# Patient Record
Sex: FEMALE | Race: BLACK OR AFRICAN AMERICAN | NOT HISPANIC OR LATINO | URBAN - METROPOLITAN AREA
[De-identification: names, ages, dates, MRNs, and addresses within clinical notes are randomized per-mention and may not be internally consistent; named-entity substitution may affect disease eponyms.]

---

## 2017-03-17 ENCOUNTER — PATIENT MESSAGE (OUTPATIENT)
Dept: OBSTETRICS AND GYNECOLOGY | Facility: CLINIC | Age: 37
End: 2017-03-17

## 2017-03-18 ENCOUNTER — NURSE TRIAGE (OUTPATIENT)
Dept: ADMINISTRATIVE | Facility: CLINIC | Age: 37
End: 2017-03-18

## 2017-03-18 NOTE — TELEPHONE ENCOUNTER
Reason for Disposition   Message left on identified answering machine.    Protocols used: ST NO CONTACT OR DUPLICATE CONTACT CALL-A-AH

## 2017-03-20 ENCOUNTER — OFFICE VISIT (OUTPATIENT)
Dept: OBSTETRICS AND GYNECOLOGY | Facility: CLINIC | Age: 37
End: 2017-03-20
Payer: COMMERCIAL

## 2017-03-20 VITALS
SYSTOLIC BLOOD PRESSURE: 112 MMHG | BODY MASS INDEX: 37.47 KG/M2 | WEIGHT: 224.88 LBS | DIASTOLIC BLOOD PRESSURE: 80 MMHG | HEIGHT: 65 IN

## 2017-03-20 DIAGNOSIS — L73.9 FOLLICULITIS: Primary | ICD-10-CM

## 2017-03-20 PROCEDURE — 99214 OFFICE O/P EST MOD 30 MIN: CPT | Mod: S$GLB,,, | Performed by: NURSE PRACTITIONER

## 2017-03-20 PROCEDURE — 99999 PR PBB SHADOW E&M-EST. PATIENT-LVL II: CPT | Mod: PBBFAC,,, | Performed by: NURSE PRACTITIONER

## 2017-03-20 PROCEDURE — 87529 HSV DNA AMP PROBE: CPT

## 2017-03-20 PROCEDURE — 1160F RVW MEDS BY RX/DR IN RCRD: CPT | Mod: S$GLB,,, | Performed by: NURSE PRACTITIONER

## 2017-03-20 RX ORDER — CYCLOBENZAPRINE HCL 10 MG
10 TABLET ORAL
COMMUNITY
End: 2017-03-20

## 2017-03-20 RX ORDER — HYDROCODONE BITARTRATE AND ACETAMINOPHEN 7.5; 325 MG/1; MG/1
1 TABLET ORAL EVERY 6 HOURS PRN
Refills: 0 | COMMUNITY
Start: 2017-01-27 | End: 2018-01-05

## 2017-03-20 RX ORDER — CEFADROXIL 500 MG/1
500 CAPSULE ORAL EVERY 12 HOURS
Qty: 14 CAPSULE | Refills: 0 | Status: SHIPPED | OUTPATIENT
Start: 2017-03-20 | End: 2017-03-27

## 2017-03-20 NOTE — PROGRESS NOTES
"Camille Yuen is a 37 y.o. female  presents with complaint of vaginal irritation after pulling pubic hairs from vaginal area and then shaving.  When she got out of shower sprayed body spray in vaginal area and has been very tender and itching since last Wednesday.     Past Medical History:   Diagnosis Date    Abnormal Pap smear     EKL, HPV, per pt, all normal since    Abnormal Pap smear of cervix     Muscle spasm      Past Surgical History:   Procedure Laterality Date     SECTION      x 1    TUBAL LIGATION       Social History   Substance Use Topics    Smoking status: Never Smoker    Smokeless tobacco: None    Alcohol use Yes      Comment: occasionally     Family History   Problem Relation Age of Onset    Diabetes Maternal Grandmother     Diabetes Mother     Breast cancer Neg Hx     Colon cancer Neg Hx     Ovarian cancer Neg Hx     Thrombophilia Neg Hx      OB History    Para Term  AB SAB TAB Ectopic Multiple Living   2 2       1 3      # Outcome Date GA Lbr Toby/2nd Weight Sex Delivery Anes PTL Lv   2 Para            1 Para                   /80  Ht 5' 5" (1.651 m)  Wt 102 kg (224 lb 13.9 oz)  LMP 2017  BMI 37.42 kg/m2    ROS:  GENERAL: No fever, chills, fatigability or weight loss.  VULVAR: per hpi  VAGINAL:  Per hpi  ABDOMEN: No abdominal pain. Denies nausea. Denies vomiting. No diarrhea. No constipation  BREAST: Denies pain. No lumps. No discharge.  URINARY: No incontinence, no nocturia, no frequency and no dysuria.  CARDIOVASCULAR: No chest pain. No shortness of breath. No leg cramps.  NEUROLOGICAL: No headaches. No vision changes.    PHYSICAL EXAM:  VULVA: has at right inner labia a fluid filled round ulcerated lesion - was not able to express any fluid and did not open b/c pt could not tolerate - also was only about 3-4 mm in diameter so is very small, outer left labia with ulcerated lesion - HSV culture taken of both    ASSESSMENT and PLAN:  1. " Folliculitis  Herpes Simplex Virus 1&2 PCR Non-Blood Genital    cefadroxil (DURICEF) 500 MG Cap     Did discuss with pt that these ulcerations could be HSV and needed to rule out  Will start an antibiotic and do warm soaks while wait for culture - pt stated understanding    Patient was counseled today on vaginitis prevention including :  a. avoiding feminine products such as deoderant soaps, body wash, bubble bath, douches, scented toilet paper, deoderant tampons or pads, feminine wipes, chronic pad use, etc.  b. avoiding other vulvovaginal irritants such as long hot baths, humidity, tight, synthetic clothing, chlorine and sitting around in wet bathing suits  c. wearing cotton underwear, avoiding thong underwear and no underwear to bed  d. taking showers instead of baths and use a hair dryer on cool setting afterwards to dry  e. wearing cotton to exercise and shower immediately after exercise and change clothes  f. using polyurethane condoms without spermicide if sexually active and symptoms are triggered by intercourse

## 2017-03-23 ENCOUNTER — PATIENT MESSAGE (OUTPATIENT)
Dept: OBSTETRICS AND GYNECOLOGY | Facility: CLINIC | Age: 37
End: 2017-03-23

## 2017-03-23 LAB
HSV PCR SPECIMEN SOURCE: NORMAL
HSV1 PCR RESULT: NOT DETECTED
HSV2 PCR RESULT: NOT DETECTED

## 2017-04-21 ENCOUNTER — OFFICE VISIT (OUTPATIENT)
Dept: OBSTETRICS AND GYNECOLOGY | Facility: CLINIC | Age: 37
End: 2017-04-21
Payer: COMMERCIAL

## 2017-04-21 VITALS
HEIGHT: 65 IN | WEIGHT: 228.81 LBS | SYSTOLIC BLOOD PRESSURE: 132 MMHG | DIASTOLIC BLOOD PRESSURE: 88 MMHG | BODY MASS INDEX: 38.12 KG/M2

## 2017-04-21 DIAGNOSIS — Z71.1 FEARED CONDITION NOT DEMONSTRATED: Primary | ICD-10-CM

## 2017-04-21 PROCEDURE — 99213 OFFICE O/P EST LOW 20 MIN: CPT | Mod: S$GLB,,, | Performed by: OBSTETRICS & GYNECOLOGY

## 2017-04-21 PROCEDURE — 99999 PR PBB SHADOW E&M-EST. PATIENT-LVL II: CPT | Mod: PBBFAC,,, | Performed by: OBSTETRICS & GYNECOLOGY

## 2017-04-21 PROCEDURE — 1160F RVW MEDS BY RX/DR IN RCRD: CPT | Mod: S$GLB,,, | Performed by: OBSTETRICS & GYNECOLOGY

## 2017-04-21 NOTE — PROGRESS NOTES
"Camille Yuen is a 37 y.o.  who presents for   Chief Complaint   Patient presents with    Pelvic Discomfort     c/o pain and itching at incision site   - pt's C/S done by me for her 2nd twin 10 yrs ago - both boys doing well    Pt was upset about last visit and suggestion of pos HSV, the irritation got better w tx and did well on her cruise   - long talk re the nature, signs, symptoms of HSV and difficulty w dx and duty to inform pt of our concern    Patient's last menstrual period was 2017.   Periods are regular q month.   No dysmenorrhea.    Pt is sexually active -  mut monog - uses BTL for contraception.    + hx of abnormal paps, HPV at EKL. Last pap was normal on 16.    Past Medical History:   Diagnosis Date    Abnormal Pap smear     EKL, HPV, per pt, all normal since    Abnormal Pap smear of cervix     Muscle spasm        Past Surgical History:   Procedure Laterality Date     SECTION      x 1    TUBAL LIGATION         Current Outpatient Prescriptions   Medication Sig Dispense Refill    hydrocodone-acetaminophen 7.5-325mg (NORCO) 7.5-325 mg per tablet Take 1 tablet by mouth every 6 (six) hours as needed.  0     No current facility-administered medications for this visit.           Review of patient's allergies indicates:  No Known Allergies    .  Family History   Problem Relation Age of Onset    Diabetes Maternal Grandmother     Diabetes Mother     Breast cancer Neg Hx     Colon cancer Neg Hx     Ovarian cancer Neg Hx     Thrombophilia Neg Hx        Social History   Substance Use Topics    Smoking status: Never Smoker    Smokeless tobacco: None    Alcohol use Yes      Comment: occasionally       /88  Ht 5' 5" (1.651 m)  Wt 103.8 kg (228 lb 13.4 oz)  LMP 2017  BMI 38.08 kg/m2    GEN:  Alert and oriented lady in no acute distress.  HEAD and NECK: Normocephalic.   SKIN: No significant hirsutism or acne              ABDOMEN: Overweight, soft and non " tender. No mass, hepatomegaly, RUQT or CVAT. Pt's pfann[employer]iel [employer] looks fine - area of concern is several 2 mm pale macules in the stria of her lower abdomen - no tenderness or inflam  PELVIC:def          IMPRESSION: benign excoriations      PLAN: reassurance and routine f/u

## 2017-08-14 ENCOUNTER — PATIENT MESSAGE (OUTPATIENT)
Dept: OBSTETRICS AND GYNECOLOGY | Facility: CLINIC | Age: 37
End: 2017-08-14

## 2017-08-14 ENCOUNTER — OFFICE VISIT (OUTPATIENT)
Dept: OBSTETRICS AND GYNECOLOGY | Facility: CLINIC | Age: 37
End: 2017-08-14
Payer: COMMERCIAL

## 2017-08-14 VITALS
DIASTOLIC BLOOD PRESSURE: 80 MMHG | BODY MASS INDEX: 38.12 KG/M2 | HEIGHT: 65 IN | SYSTOLIC BLOOD PRESSURE: 130 MMHG | WEIGHT: 228.81 LBS

## 2017-08-14 DIAGNOSIS — B37.31 YEAST VAGINITIS: ICD-10-CM

## 2017-08-14 DIAGNOSIS — N76.2 ACUTE VULVITIS: Primary | ICD-10-CM

## 2017-08-14 PROCEDURE — 87077 CULTURE AEROBIC IDENTIFY: CPT

## 2017-08-14 PROCEDURE — 87186 SC STD MICRODIL/AGAR DIL: CPT

## 2017-08-14 PROCEDURE — 99999 PR PBB SHADOW E&M-EST. PATIENT-LVL III: CPT | Mod: PBBFAC,,, | Performed by: NURSE PRACTITIONER

## 2017-08-14 PROCEDURE — 87210 SMEAR WET MOUNT SALINE/INK: CPT | Mod: QW,S$GLB,, | Performed by: NURSE PRACTITIONER

## 2017-08-14 PROCEDURE — 99214 OFFICE O/P EST MOD 30 MIN: CPT | Mod: S$GLB,,, | Performed by: NURSE PRACTITIONER

## 2017-08-14 PROCEDURE — 3008F BODY MASS INDEX DOCD: CPT | Mod: S$GLB,,, | Performed by: NURSE PRACTITIONER

## 2017-08-14 PROCEDURE — 87070 CULTURE OTHR SPECIMN AEROBIC: CPT

## 2017-08-14 RX ORDER — FLUCONAZOLE 150 MG/1
TABLET ORAL
Qty: 2 TABLET | Refills: 1 | Status: SHIPPED | OUTPATIENT
Start: 2017-08-14 | End: 2018-01-05

## 2017-08-14 RX ORDER — NYSTATIN AND TRIAMCINOLONE ACETONIDE 100000; 1 [USP'U]/G; MG/G
CREAM TOPICAL
Qty: 30 G | Refills: 1 | Status: SHIPPED | OUTPATIENT
Start: 2017-08-14 | End: 2018-01-05

## 2017-08-14 NOTE — PROGRESS NOTES
"Camille Yuen is a 37 y.o. female  presents with complaint of vaginal itching that seems related with cycle.  This time itching so bad keeping her up at night.     Past Medical History:   Diagnosis Date    Abnormal Pap smear     EKL, HPV, per pt, all normal since    Abnormal Pap smear of cervix     Muscle spasm      Past Surgical History:   Procedure Laterality Date     SECTION      x 1    TUBAL LIGATION       Social History   Substance Use Topics    Smoking status: Never Smoker    Smokeless tobacco: Never Used    Alcohol use Yes      Comment: occasionally     Family History   Problem Relation Age of Onset    Diabetes Maternal Grandmother     Diabetes Mother     Breast cancer Neg Hx     Colon cancer Neg Hx     Ovarian cancer Neg Hx     Thrombophilia Neg Hx      OB History    Para Term  AB Living   2 2       3   SAB TAB Ectopic Multiple Live Births         1        # Outcome Date GA Lbr Toby/2nd Weight Sex Delivery Anes PTL Lv   2 Para            1 Para                   /80   Ht 5' 5" (1.651 m)   Wt 103.8 kg (228 lb 13.4 oz)   LMP 2017 (Exact Date)   BMI 38.08 kg/m²     ROS:  GENERAL: No fever, chills, fatigability or weight loss.  VULVAR: No pain, no lesions and no itching.  VAGINAL: No relaxation, no itching, no discharge, no abnormal bleeding and no lesions.  ABDOMEN: No abdominal pain. Denies nausea. Denies vomiting. No diarrhea. No constipation  BREAST: Denies pain. No lumps. No discharge.  URINARY: No incontinence, no nocturia, no frequency and no dysuria.  CARDIOVASCULAR: No chest pain. No shortness of breath. No leg cramps.  NEUROLOGICAL: No headaches. No vision changes.    PHYSICAL EXAM:  VULVA: inflamed sebaceous gland to mid left labia, VAGINA: vaginal discharge - creamy, CERVIX: normal appearing cervix without discharge or lesions, UTERUS: uterus is normal size, shape, consistency and nontender, ADNEXA: normal adnexa in size, nontender and no " masses, WET MOUNT done - results: spores    ASSESSMENT and PLAN:  1. Acute vulvitis  POCT Wet Prep    Genital Culture    fluconazole (DIFLUCAN) 150 MG Tab    nystatin-triamcinolone (MYCOLOG II) cream    CANCELED: POCT Wet Prep   2. Yeast vaginitis  POCT Wet Prep    Genital Culture    fluconazole (DIFLUCAN) 150 MG Tab    nystatin-triamcinolone (MYCOLOG II) cream     Treat today  Try a dose or two of monistat 7 after cycle resolves monthly     Patient was counseled today on vaginitis prevention including :  a. avoiding feminine products such as deoderant soaps, body wash, bubble bath, douches, scented toilet paper, deoderant tampons or pads, feminine wipes, chronic pad use, etc.  b. avoiding other vulvovaginal irritants such as long hot baths, humidity, tight, synthetic clothing, chlorine and sitting around in wet bathing suits  c. wearing cotton underwear, avoiding thong underwear and no underwear to bed  d. taking showers instead of baths and use a hair dryer on cool setting afterwards to dry  e. wearing cotton to exercise and shower immediately after exercise and change clothes  f. using polyurethane condoms without spermicide if sexually active and symptoms are triggered by intercourse

## 2017-08-17 DIAGNOSIS — N76.0 BV (BACTERIAL VAGINOSIS): Primary | ICD-10-CM

## 2017-08-17 DIAGNOSIS — B96.89 BV (BACTERIAL VAGINOSIS): Primary | ICD-10-CM

## 2017-08-17 RX ORDER — SULFAMETHOXAZOLE AND TRIMETHOPRIM 800; 160 MG/1; MG/1
1 TABLET ORAL 2 TIMES DAILY
Qty: 14 TABLET | Refills: 0 | Status: SHIPPED | OUTPATIENT
Start: 2017-08-17 | End: 2017-08-24

## 2017-08-18 ENCOUNTER — PATIENT MESSAGE (OUTPATIENT)
Dept: OBSTETRICS AND GYNECOLOGY | Facility: CLINIC | Age: 37
End: 2017-08-18

## 2017-08-18 LAB — BACTERIA GENITAL AEROBE CULT: NORMAL

## 2017-08-21 ENCOUNTER — PATIENT MESSAGE (OUTPATIENT)
Dept: OBSTETRICS AND GYNECOLOGY | Facility: CLINIC | Age: 37
End: 2017-08-21

## 2017-08-23 ENCOUNTER — TELEPHONE (OUTPATIENT)
Dept: OBSTETRICS AND GYNECOLOGY | Facility: CLINIC | Age: 37
End: 2017-08-23

## 2017-08-23 NOTE — TELEPHONE ENCOUNTER
----- Message from Divine Diez LPN sent at 8/22/2017 10:38 AM CDT -----  Left message again for pt to return my call.

## 2017-08-24 ENCOUNTER — TELEPHONE (OUTPATIENT)
Dept: OBSTETRICS AND GYNECOLOGY | Facility: CLINIC | Age: 37
End: 2017-08-24

## 2017-12-07 ENCOUNTER — OFFICE VISIT (OUTPATIENT)
Dept: OBSTETRICS AND GYNECOLOGY | Facility: CLINIC | Age: 37
End: 2017-12-07
Payer: COMMERCIAL

## 2017-12-07 VITALS
SYSTOLIC BLOOD PRESSURE: 123 MMHG | HEIGHT: 65 IN | BODY MASS INDEX: 38.89 KG/M2 | DIASTOLIC BLOOD PRESSURE: 84 MMHG | WEIGHT: 233.44 LBS

## 2017-12-07 DIAGNOSIS — L25.9 SKIN IRRITATION FROM SHAVING: Primary | ICD-10-CM

## 2017-12-07 DIAGNOSIS — N76.3 CHRONIC VULVITIS: ICD-10-CM

## 2017-12-07 PROCEDURE — 99214 OFFICE O/P EST MOD 30 MIN: CPT | Mod: S$GLB,,, | Performed by: NURSE PRACTITIONER

## 2017-12-07 PROCEDURE — 99999 PR PBB SHADOW E&M-EST. PATIENT-LVL III: CPT | Mod: PBBFAC,,, | Performed by: NURSE PRACTITIONER

## 2017-12-07 NOTE — PROGRESS NOTES
"Camille Yuen is a 37 y.o. female  presents with complaint of increase with irritation and itching over last few days. Feeling some bumps in vaginal area.  She states she did shave but was about a week ago.  Bought new type of razor.     Past Medical History:   Diagnosis Date    Abnormal Pap smear     EKL, HPV, per pt, all normal since    Abnormal Pap smear of cervix     Muscle spasm      Past Surgical History:   Procedure Laterality Date     SECTION      x 1    TUBAL LIGATION       Social History   Substance Use Topics    Smoking status: Never Smoker    Smokeless tobacco: Never Used    Alcohol use Yes      Comment: occasionally     Family History   Problem Relation Age of Onset    Diabetes Maternal Grandmother     Diabetes Mother     Breast cancer Neg Hx     Colon cancer Neg Hx     Ovarian cancer Neg Hx     Thrombophilia Neg Hx      OB History    Para Term  AB Living   2 2       3   SAB TAB Ectopic Multiple Live Births         1        # Outcome Date GA Lbr Toby/2nd Weight Sex Delivery Anes PTL Lv   2 Para            1 Para                   /84   Ht 5' 5" (1.651 m)   Wt 105.9 kg (233 lb 7.5 oz)   LMP 2017 (Approximate)   BMI 38.85 kg/m²     ROS:  GENERAL: No fever, chills, fatigability or weight loss.  VULVAR: No pain, no lesions and no itching.  VAGINAL: No relaxation, no itching, no discharge, no abnormal bleeding and no lesions.  ABDOMEN: No abdominal pain. Denies nausea. Denies vomiting. No diarrhea. No constipation  BREAST: Denies pain. No lumps. No discharge.  URINARY: No incontinence, no nocturia, no frequency and no dysuria.  CARDIOVASCULAR: No chest pain. No shortness of breath. No leg cramps.  NEUROLOGICAL: No headaches. No vision changes.    PHYSICAL EXAM:  VULVA: has to upper labia on both sides at same location a small pea size subcutaneous nodule, VAGINA: normal appearing vagina with normal color and discharge, no lesions, CERVIX: normal " appearing cervix without discharge or lesions, UTERUS: uterus is normal size, shape, consistency and nontender, ADNEXA: normal adnexa in size, nontender and no masses, WET MOUNT done - results: negative for pathogens, normal epithelial cells    ASSESSMENT and PLAN:  1. Skin irritation from shaving  POCT Wet Prep    Follicle stimulating hormone    ESTROGENS, TOTAL    TSH   2. Chronic vulvitis  Follicle stimulating hormone    ESTROGENS, TOTAL    TSH     Recommend stop shaving all together, nodules probably inflammation in follicles from shaving as hair is growing out   Try antihistamine cream to skin     Patient was counseled today on vaginitis prevention including :  a. avoiding feminine products such as deoderant soaps, body wash, bubble bath, douches, scented toilet paper, deoderant tampons or pads, feminine wipes, chronic pad use, etc.  b. avoiding other vulvovaginal irritants such as long hot baths, humidity, tight, synthetic clothing, chlorine and sitting around in wet bathing suits  c. wearing cotton underwear, avoiding thong underwear and no underwear to bed  d. taking showers instead of baths and use a hair dryer on cool setting afterwards to dry  e. wearing cotton to exercise and shower immediately after exercise and change clothes  f. using polyurethane condoms without spermicide if sexually active and symptoms are triggered by intercourse

## 2018-01-05 ENCOUNTER — OFFICE VISIT (OUTPATIENT)
Dept: URGENT CARE | Facility: CLINIC | Age: 38
End: 2018-01-05
Payer: COMMERCIAL

## 2018-01-05 VITALS
BODY MASS INDEX: 38.93 KG/M2 | HEIGHT: 65 IN | WEIGHT: 233.69 LBS | HEART RATE: 84 BPM | TEMPERATURE: 97 F | DIASTOLIC BLOOD PRESSURE: 82 MMHG | SYSTOLIC BLOOD PRESSURE: 122 MMHG

## 2018-01-05 DIAGNOSIS — R51.9 NONINTRACTABLE HEADACHE, UNSPECIFIED CHRONICITY PATTERN, UNSPECIFIED HEADACHE TYPE: Primary | ICD-10-CM

## 2018-01-05 PROCEDURE — 99213 OFFICE O/P EST LOW 20 MIN: CPT | Mod: SA,S$GLB,, | Performed by: PHYSICIAN ASSISTANT

## 2018-01-05 PROCEDURE — 3008F BODY MASS INDEX DOCD: CPT | Mod: S$GLB,,, | Performed by: PHYSICIAN ASSISTANT

## 2018-01-05 PROCEDURE — 99999 PR PBB SHADOW E&M-EST. PATIENT-LVL III: CPT | Mod: PBBFAC,,,

## 2018-01-05 NOTE — PATIENT INSTRUCTIONS
"  Headache, Unspecified    A number of things can cause headaches. The cause of your headache isnt clear. But it doesnt seem to be a sign of any serious illness.  You could have a tension headache or a migraine headache.  Stress can cause a tension headache. This can happen if you tense the muscles of your shoulders, neck, and scalp without knowing it. If this stress lasts long enough, you may develop a tension headache.  It is not clear why migraines occur, but certain things called" triggers" can raise the risk of having a migraine attack. Migraine triggers may include emotional stress or depression, or by hormone changes during the menstrual cycle. Other triggers include birth control pills and other medicines, alcohol or caffeine, foods with tyramine (such as aged cheese, wine), eyestrain, weather changes, missed meals, and lack of sleep or oversleeping.  Other causes of headache include:  · Viral illness with high fever  · Head injury with concussion  · Sinus, ear, or throat infection  · Dental pain and jaw joint (TMJ) pain  More serious but less common causes of headache include stroke, brain hemorrhage, brain tumor, meningitis, and encephalitis.  Home care  Follow these tips when taking care of yourself at home:  · Dont drive yourself home if you were given pain medicine for your headache. Instead, have someone else drive you home. Try to sleep when you get home. You should feel much better when you wake up.  · Apply heat to the back of your neck to ease a neck muscle spasm. Take care of a migraine headache by putting an ice pack on your forehead or at the base of your skull.  · If you have nausea or vomiting, eat a light diet until your headache eases.  · If you have a migraine headache, use sunglasses when in the daylight or around bright indoor lighting until your symptoms get better. Bright glaring light can make this type of headache worse.  Follow-up care  Follow up with your healthcare provider, or " as advised. Talk with your provider if you have frequent headaches. He or she can help figure out a treatment plan. By knowing the earliest signs of headache, and starting treatment right away, you may be able to stop the pain yourself.  When to seek medical advice  Call your healthcare provider right away if any of these occur:  · Your head pain suddenly gets worse after sexual intercourse or strenuous activity  · Your head pain doesnt get better within 24 hours  · You arent able to keep liquids down (repeated vomiting)  · Fever of 100.4ºF (38ºC) or higher, or as directed by your healthcare provider  · Stiff neck  · Extreme drowsiness, confusion, or fainting  · Dizziness or dizziness with spinning sensation (vertigo)  · Weakness in an arm or leg or one side of your face  · You have trouble talking or seeing  Date Last Reviewed: 8/1/2016  © 8633-2367 [employer]. [address] [place] [address], [place] [address]. All rights reserved. This information is not intended as a substitute for professional medical care. Always follow your healthcare professional's instructions.

## 2018-01-05 NOTE — PROGRESS NOTES
"Subjective:      Patient ID: Camille Yuen is a 37 y.o. female.    Chief Complaint: Headache    Tuesday night was playing board games with kids and sharp pain ("shock-like") to the back of the head, caused her to buckle over  No trauma/injury  Yesterday felt sharp pain sporadically throughout the day, 5x and each time lasts a few seconds  Patient denies any other symptoms at this time      Headache    This is a new problem. The current episode started in the past 7 days (3days). The problem has been waxing and waning. The pain is located in the occipital region. The pain does not radiate. The pain quality is not similar to prior headaches. The quality of the pain is described as pulsating. Pertinent negatives include no abdominal pain, coughing, dizziness, fever, nausea, numbness, photophobia, rhinorrhea, vomiting or weakness. Treatments tried: Ibuprofen. Her past medical history is significant for migraines in the family. There is no history of migraine headaches or recent head traumas.     Review of Systems   Constitutional: Negative for chills, diaphoresis and fever.   HENT: Negative for congestion and rhinorrhea.    Eyes: Negative for photophobia and visual disturbance.   Respiratory: Negative for cough, shortness of breath and wheezing.    Gastrointestinal: Negative for abdominal pain, constipation, diarrhea, nausea and vomiting.   Skin: Negative for rash.   Neurological: Positive for headaches. Negative for dizziness, speech difficulty, weakness, light-headedness and numbness.   Psychiatric/Behavioral: Negative for confusion.       Objective:   /82   Pulse 84   Temp 96.7 °F (35.9 °C) (Tympanic)   Ht 5' 5" (1.651 m)   Wt 106 kg (233 lb 11 oz)   BMI 38.89 kg/m²   Physical Exam   Constitutional: She is oriented to person, place, and time. She appears well-developed and well-nourished. She does not appear ill. No distress.   HENT:   Head: Normocephalic and atraumatic.   Nose: Right sinus exhibits no " maxillary sinus tenderness and no frontal sinus tenderness. Left sinus exhibits no maxillary sinus tenderness and no frontal sinus tenderness.   Eyes: Conjunctivae, EOM and lids are normal. Pupils are equal, round, and reactive to light.   Cardiovascular: Normal rate, regular rhythm and normal heart sounds.    No murmur heard.  Pulmonary/Chest: Effort normal and breath sounds normal. No respiratory distress. She has no decreased breath sounds. She has no wheezes. She has no rhonchi. She has no rales.   Neurological: She is alert and oriented to person, place, and time. She has normal strength. No cranial nerve deficit or sensory deficit.   CN 2, 4, 6, 7, 11 and 12 checked and intact   Skin: Skin is warm and dry. No rash noted. She is not diaphoretic.   Psychiatric: She has a normal mood and affect. Her speech is normal and behavior is normal. Thought content normal.     Assessment:      1. Nonintractable headache, unspecified chronicity pattern, unspecified headache type       Plan:   Nonintractable headache, unspecified chronicity pattern, unspecified headache type    Advise keeping headache journal with onset of headache, location, duration, diet (especialyl water intake and caffeine).    Follow up with PCP if symptoms continue.     Gave handout on headache.  Printed AVS and reviewed treatment plan in detail.    Discussed worsening signs/symptoms and when to return to clinic or go to ED.   Patient expresses understanding and agrees with treatment plan.

## 2020-07-09 ENCOUNTER — OFFICE VISIT (OUTPATIENT)
Dept: OBSTETRICS AND GYNECOLOGY | Facility: CLINIC | Age: 40
End: 2020-07-09
Payer: COMMERCIAL

## 2020-07-09 VITALS
SYSTOLIC BLOOD PRESSURE: 132 MMHG | DIASTOLIC BLOOD PRESSURE: 90 MMHG | HEIGHT: 65 IN | BODY MASS INDEX: 40.52 KG/M2 | WEIGHT: 243.19 LBS

## 2020-07-09 DIAGNOSIS — N94.6 DYSMENORRHEA: ICD-10-CM

## 2020-07-09 DIAGNOSIS — Z12.31 ENCOUNTER FOR SCREENING MAMMOGRAM FOR BREAST CANCER: ICD-10-CM

## 2020-07-09 DIAGNOSIS — Z01.419 ENCOUNTER FOR GYNECOLOGICAL EXAMINATION WITHOUT ABNORMAL FINDING: Primary | ICD-10-CM

## 2020-07-09 PROCEDURE — 99999 PR PBB SHADOW E&M-EST. PATIENT-LVL III: CPT | Mod: PBBFAC,,, | Performed by: NURSE PRACTITIONER

## 2020-07-09 PROCEDURE — 99396 PREV VISIT EST AGE 40-64: CPT | Mod: S$GLB,,, | Performed by: NURSE PRACTITIONER

## 2020-07-09 PROCEDURE — 99999 PR PBB SHADOW E&M-EST. PATIENT-LVL III: ICD-10-PCS | Mod: PBBFAC,,, | Performed by: NURSE PRACTITIONER

## 2020-07-09 PROCEDURE — 87624 HPV HI-RISK TYP POOLED RSLT: CPT

## 2020-07-09 PROCEDURE — 99396 PR PREVENTIVE VISIT,EST,40-64: ICD-10-PCS | Mod: S$GLB,,, | Performed by: NURSE PRACTITIONER

## 2020-07-09 PROCEDURE — 88175 CYTOPATH C/V AUTO FLUID REDO: CPT

## 2020-07-09 RX ORDER — DIAZEPAM 10 MG/1
TABLET ORAL
COMMUNITY
Start: 2020-05-06 | End: 2023-04-11

## 2020-07-09 RX ORDER — NAPROXEN SODIUM 550 MG/1
550 TABLET ORAL 2 TIMES DAILY WITH MEALS
Qty: 30 TABLET | Refills: 2 | Status: SHIPPED | OUTPATIENT
Start: 2020-07-09 | End: 2020-07-14

## 2020-07-09 RX ORDER — ESCITALOPRAM OXALATE 10 MG/1
TABLET ORAL
COMMUNITY
Start: 2020-07-06

## 2020-07-09 NOTE — PATIENT INSTRUCTIONS
Breast Health: Breast Self-Awareness  What is breast self-awareness?  Breast self-awareness is knowing how your breasts normally look and feel. Your breasts change as you go through different stages of your life. So its important to learn what is normal for your breasts. Breast self-awareness helps you notice any changes in your breasts right away. Report any changes to your healthcare provider.  Why is breast self-awareness important?  Many experts now say that women should focus on breast self-awareness instead of doing a breast self-examination (BSE). These experts include the American Cancer Society, the U.S. Preventive Services Task Force, and the American Congress of Obstetricians and Gynecologists. Some experts even advise not teaching women to do a BSE. Thats because research hasnt shown a clear benefit to doing BSEs.  Breast self-awareness is different than a BSE. Breast self-awareness isnt about following a certain method and schedule. Its about knowing what's normal for your breasts. That way you can notice even small changes right away. If you see any changes, report them to your healthcare provider.  Changes to look for  Call your healthcare provider if you find any changes in your breasts that concern you. These changes may include:  · A lump  · Nipple discharge other than breast milk, especially a bloody discharge  · Swelling  · A change in size or shape  · Skin irritation, such as redness, thickening, or dimpling of the skin  · Swollen lymph nodes in the armpit  · Nipple problems, such as pain or redness  If you find a lump  Contact your provider if you find lumpiness in one breast, feel something different in the tissue, or feel a definite lump. Sometimes lumpiness may be due to menstrual changes. But there may be reason for concern.  Your provider may want to see you right away if you have:  · Nipple discharge that is bloody  · Skin changes on your breast, such as dimpling or puckering  Its  normal to be upset if you find a lump. But its important to contact your provider right away. Remember that most breast lumps are benign. This means they are not cancer.  Date Last Reviewed: 8/10/2015  © 7484-9858 [employer]. [address] [place] [address]. All rights reserved. This information is not intended as a substitute for professional medical care. Always follow your healthcare professional's instructions.        Painful Menstrual Periods (Dysmenorrhea)    Dysmenorrhea is the term used to describe painful menstrual periods.  The uterus is a muscle. Normally, chemicals called prostaglandins cause the uterus to contract during your period. The contractions push out the build-up of tissue that occurs each month inside the uterus. If the contraction is very strong, it can cause pain. The pain may feel like cramping in the lower abdomen, lower back, or thighs. In severe cases, you may have other symptoms as well. These can include nausea, vomiting, loose stools, sweating, or dizziness.  There are 2 types of dysmenorrhea:  Primary dysmenorrhea refers to common menstrual cramps. It may begin 1 or 2 years after you first get your period. It may get better or go away as you get older or when you have a baby. The cramps are most often felt just before, or on the day of your period. They may last 1 to 3 days. Treatment is with medicines and comfort measures as described below (see the Home care section).  Secondary dysmenorrhea may start later in life. It describes menstrual pain that occurs due to underlying health problem. The pain may last longer than common menstrual cramps. It may also worsen over time. Some problems that can lead to secondary dysmenorrhea include:   · Pelvic inflammatory disease (PID): Infection that involves the female reproductive organs, such as the uterus and fallopian tubes  · Fibroids: Benign growths within the wall of the uterus (not cancer)  · Endometriosis:  Tissue that normally only lines the uterus also grows outside of it (because the abnormal tissue also swells and bleeds each month, it can cause pain)  Once the cause of secondary dysmenorrhea is found, it can be treated. Your healthcare provider will discuss options with you as needed. Your care may also include some of the treatments described below (see the Home care section).  Home care  Medicines  Certain medicines can be used to help relieve or prevent menstrual pain and cramping. These can include:  · Nonsteroidal anti-inflammatory drugs (NSAIDs), such as ibuprofen  · Prescription pain medicine, if needed  · Hormone therapy (this includes most methods of hormonal birth control such as pills, shots, or a hormone-releasing IUD)  General care  To help relieve pain and cramping, try these tips:  · Rest as needed.  · Apply a heating pad to the lower belly or back as directed. A warm bath or massage to these areas may also help.  · Exercise regularly. Many women find that being more active each week helps reduce pain and cramping.  · Ask your healthcare provider for advice about other treatments you can try to help control pain and cramping.  Follow-up care  Follow up with your healthcare provider as advised.  When to seek medical advice  Call your healthcare provider right away if any of these occur:  · Fever of 100.4°F (38°C) or higher, or as directed by your provider  · Pain or cramping worsens or doesnt improve with medicine  · Pain or cramping lasts longer than usual or occurs between periods  · Unusual vaginal discharge between periods  · Bleeding becomes heavy (soaking more than 1 pad or tampon every hour for 3 hours)  · Passage of pink or gray tissue from the vagina  Date Last Reviewed: 6/11/2015  © 2392-8049 [employer]. [address] [place] [address], [place] [address]. All rights reserved. This information is not intended as a substitute for professional medical care. Always follow your  healthcare professional's instructions.        The Range of Pap Test Results  When your Pap test is sent to the lab, the lab studies your cell samples and reports any abnormal cell changes. Your health care provider can discuss these changes with you. In some cases, an abnormal Pap test is due to an infection. More serious cell changes range from dysplasia to cancer. Talk to your health care provider about your Pap test.    Normal results  Cervical cells, even normal ones, are always changing. As they mature, normal squamous cells move from deeper layers within the cervix. Over time, these cells flatten and cover the surface of the cervix. Within the cervical canal, the cells are different. These glandular cells are taller and not as flat as the cells on the surface of the cervix. When a Pap test sample shows healthy cells of both types, the results are negative. Keep having Pap tests as often as directed.  Abnormal results  A positive Pap test result means some cells in the sample showed abnormal changes. These results are grouped by the type of cell change and the location, or extent, of the changes. Depending on the results, you may need further testing.  · Inflammation: Noncancerous changes are present. They may be due to normal cell repair. Or, they may be caused by an infection, such as HPV or yeast. Further testing may be needed. (Also called reactive cellular changes.)  · Atypical squamous cells: Test results are unclear. Cells on the surface of the cervix show changes, but their significance is not yet known. Testing for HPV and other sexually transmitted infections (STIs) may be needed. Treatment may be required. (Reported as ASC-US or ASC-H.)  · Atypical glandular cells: Cells lining the cervical canal show abnormal changes. Further testing is likely. You may also have treatment to destroy or remove problem cells. (Reported as AGC.)  · Mild dysplasia: Cells show distinct changes. More testing or HPV  typing may be done. You may also have treatment to destroy or remove problem cells. (Reported as low-grade OSCAR or JEANETH 1.)  · Moderate to severe dysplasia: Cells show precancerous changes. Or, noninvasive cancer (carcinoma in situ) may be present. Treatment to destroy or remove problem cells is likely. (Reported as high-grade OSCAR or JEANETH 2 or JEANETH 3.)  · Cancer: Different types of cancer may be detected by your Pap test. More tests to assess the cancer's extent are likely. The type of treatment will depend on the test results and other factors, such as age and health history. (Reported as squamous cell carcinoma, endocervical adenocarcinoma in situ, or adenocarcinoma.)  Date Last Reviewed: 5/12/2015  © 8406-4330 [employer]. [address] [place] [address], [place] [address]. All rights reserved. This information is not intended as a substitute for professional medical care. Always follow your healthcare professional's instructions.

## 2020-07-09 NOTE — PROGRESS NOTES
CC: Well woman exam    Camlile Yuen is a 40 y.o. female  presents for well woman exam.  LMP: Patient's last menstrual period was 2020..    Having dysmenorrea since BTL 13 yrs ago. Discussed mgt - would like to start with premedication with anaprox.      Past Medical History:   Diagnosis Date    Abnormal Pap smear     EKL, HPV, per pt, all normal since    Abnormal Pap smear of cervix     Muscle spasm      Past Surgical History:   Procedure Laterality Date     SECTION      x 1    TUBAL LIGATION       Social History     Socioeconomic History    Marital status:      Spouse name: Not on file    Number of children: Not on file    Years of education: Not on file    Highest education level: Not on file   Occupational History    Not on file   Social Needs    Financial resource strain: Not on file    Food insecurity     Worry: Not on file     Inability: Not on file    Transportation needs     Medical: Not on file     Non-medical: Not on file   Tobacco Use    Smoking status: Never Smoker    Smokeless tobacco: Never Used   Substance and Sexual Activity    Alcohol use: Yes     Comment: occasionally    Drug use: No    Sexual activity: Yes     Partners: Male     Birth control/protection: Surgical     Comment: tubal ligation, mut monog   Lifestyle    Physical activity     Days per week: Not on file     Minutes per session: Not on file    Stress: Not on file   Relationships    Social connections     Talks on phone: Not on file     Gets together: Not on file     Attends Orthodox service: Not on file     Active member of club or organization: Not on file     Attends meetings of clubs or organizations: Not on file     Relationship status: Not on file   Other Topics Concern    Not on file   Social History Narrative    Not on file     Family History   Problem Relation Age of Onset    Diabetes Maternal Grandmother     Diabetes Mother     Breast cancer Neg Hx     Colon cancer  "Neg Hx     Ovarian cancer Neg Hx     Thrombophilia Neg Hx      OB History        2    Para   2    Term                AB        Living   3       SAB        TAB        Ectopic        Multiple   1    Live Births                     BP (!) 132/90   Ht 5' 5" (1.651 m)   Wt 110.3 kg (243 lb 2.7 oz)   LMP 2020   BMI 40.47 kg/m²       ROS:  GENERAL: Denies weight gain or weight loss. Feeling well overall.   SKIN: Denies rash or lesions.   HEAD: Denies head injury or headache.   NODES: Denies enlarged lymph nodes.   CHEST: Denies chest pain or shortness of breath.   CARDIOVASCULAR: Denies palpitations or left sided chest pain.   ABDOMEN: No abdominal pain, constipation, diarrhea, nausea, vomiting or rectal bleeding.   URINARY: No frequency, dysuria, hematuria, or burning on urination.  REPRODUCTIVE: See HPI.   BREASTS: The patient performs breast self-examination and denies pain, lumps, or nipple discharge.   HEMATOLOGIC: No easy bruisability or excessive bleeding.   MUSCULOSKELETAL: Denies joint pain or swelling.   NEUROLOGIC: Denies syncope or weakness.   PSYCHIATRIC: Denies depression, anxiety or mood swings.    PHYSICAL EXAM:  APPEARANCE: Well nourished, well developed, in no acute distress.  AFFECT: WNL, alert and oriented x 3  SKIN: No acne or hirsutism  NECK: Neck symmetric without masses or thyromegaly  NODES: No inguinal, cervical, axillary, or femoral lymph node enlargement  CHEST: Good respiratory effect  ABDOMEN: Soft.  No tenderness or masses.  No hepatosplenomegaly.  No hernias.  BREASTS: Symmetrical, no skin changes or visible lesions.  No palpable masses, nipple discharge bilaterally.  PELVIC: Normal external genitalia without lesions.  Normal hair distribution.  Adequate perineal body, normal urethral meatus.  Vagina moist and well rugated without lesions. mucoid discharge.  Cervix pink, without lesions, discharge or tenderness.  No significant cystocele or rectocele.  Bimanual " exam shows uterus to be normal size, regular, mobile and nontender.  Adnexa without masses or tenderness.    EXTREMITIES: No edema.  Physical Exam    1. Encounter for gynecological examination without abnormal finding  Liquid-Based Pap Smear, Screening    HPV High Risk Genotypes, PCR   2. Encounter for screening mammogram for breast cancer  Mammo Digital Screening Bilat With CAD    AND PLAN:    Patient was counseled today on A.C.S. Pap guidelines and recommendations for yearly pelvic exams, mammograms and monthly self breast exams; to see her PCP for other health maintenance.

## 2020-07-17 LAB
HPV HR 12 DNA SPEC QL NAA+PROBE: NEGATIVE
HPV16 AG SPEC QL: NEGATIVE
HPV18 DNA SPEC QL NAA+PROBE: NEGATIVE

## 2020-07-20 LAB
FINAL PATHOLOGIC DIAGNOSIS: NORMAL
Lab: NORMAL

## 2020-07-21 ENCOUNTER — PATIENT MESSAGE (OUTPATIENT)
Dept: OBSTETRICS AND GYNECOLOGY | Facility: CLINIC | Age: 40
End: 2020-07-21

## 2020-07-22 ENCOUNTER — HOSPITAL ENCOUNTER (OUTPATIENT)
Dept: RADIOLOGY | Facility: HOSPITAL | Age: 40
Discharge: HOME | End: 2020-07-22
Attending: NURSE PRACTITIONER
Payer: COMMERCIAL

## 2020-07-22 VITALS — WEIGHT: 243.19 LBS | HEIGHT: 65 IN | BODY MASS INDEX: 40.52 KG/M2

## 2020-07-22 DIAGNOSIS — Z12.31 ENCOUNTER FOR SCREENING MAMMOGRAM FOR BREAST CANCER: ICD-10-CM

## 2020-07-22 PROCEDURE — 77063 MAMMO DIGITAL SCREENING BILAT WITH TOMOSYNTHESIS_CAD: ICD-10-PCS | Mod: 26,,, | Performed by: RADIOLOGY

## 2020-07-22 PROCEDURE — 77067 MAMMO DIGITAL SCREENING BILAT WITH TOMOSYNTHESIS_CAD: ICD-10-PCS | Mod: 26,,, | Performed by: RADIOLOGY

## 2020-07-22 PROCEDURE — 77067 SCR MAMMO BI INCL CAD: CPT | Mod: 26,,, | Performed by: RADIOLOGY

## 2020-07-22 PROCEDURE — 77063 BREAST TOMOSYNTHESIS BI: CPT | Mod: 26,,, | Performed by: RADIOLOGY

## 2020-07-22 PROCEDURE — 77067 SCR MAMMO BI INCL CAD: CPT | Mod: TC

## 2020-07-27 ENCOUNTER — HOSPITAL ENCOUNTER (OUTPATIENT)
Dept: RADIOLOGY | Facility: HOSPITAL | Age: 40
Discharge: HOME | End: 2020-07-27
Attending: NURSE PRACTITIONER
Payer: COMMERCIAL

## 2020-07-27 DIAGNOSIS — R92.8 ABNORMAL MAMMOGRAM: ICD-10-CM

## 2020-07-27 PROCEDURE — 76642 US BREAST RIGHT LIMITED: ICD-10-PCS | Mod: 26,RT,, | Performed by: RADIOLOGY

## 2020-07-27 PROCEDURE — 77065 DX MAMMO INCL CAD UNI: CPT | Mod: TC

## 2020-07-27 PROCEDURE — 77065 DX MAMMO INCL CAD UNI: CPT | Mod: 26,,, | Performed by: RADIOLOGY

## 2020-07-27 PROCEDURE — 77065 MAMMO DIGITAL DIAGNOSTIC RIGHT WITH TOMOSYNTHESIS_CAD: ICD-10-PCS | Mod: 26,,, | Performed by: RADIOLOGY

## 2020-07-27 PROCEDURE — 76642 ULTRASOUND BREAST LIMITED: CPT | Mod: TC,RT

## 2020-07-27 PROCEDURE — 77061 BREAST TOMOSYNTHESIS UNI: CPT | Mod: 26,,, | Performed by: RADIOLOGY

## 2020-07-27 PROCEDURE — 77061 BREAST TOMOSYNTHESIS UNI: CPT | Mod: TC

## 2020-07-27 PROCEDURE — 77061 MAMMO DIGITAL DIAGNOSTIC RIGHT WITH TOMOSYNTHESIS_CAD: ICD-10-PCS | Mod: 26,,, | Performed by: RADIOLOGY

## 2020-07-27 PROCEDURE — 76642 ULTRASOUND BREAST LIMITED: CPT | Mod: 26,RT,, | Performed by: RADIOLOGY

## 2020-07-29 ENCOUNTER — OFFICE VISIT (OUTPATIENT)
Dept: SURGERY | Facility: CLINIC | Age: 40
End: 2020-07-29
Payer: COMMERCIAL

## 2020-07-29 VITALS — WEIGHT: 238.13 LBS | HEIGHT: 65 IN | BODY MASS INDEX: 39.67 KG/M2

## 2020-07-29 DIAGNOSIS — N60.01 CYST OF RIGHT BREAST: Primary | ICD-10-CM

## 2020-07-29 PROCEDURE — 99999 PR PBB SHADOW E&M-EST. PATIENT-LVL III: ICD-10-PCS | Mod: PBBFAC,,, | Performed by: SURGERY

## 2020-07-29 PROCEDURE — 99204 OFFICE O/P NEW MOD 45 MIN: CPT | Mod: S$GLB,,, | Performed by: SURGERY

## 2020-07-29 PROCEDURE — 99204 PR OFFICE/OUTPT VISIT, NEW, LEVL IV, 45-59 MIN: ICD-10-PCS | Mod: S$GLB,,, | Performed by: SURGERY

## 2020-07-29 PROCEDURE — 3008F BODY MASS INDEX DOCD: CPT | Mod: CPTII,S$GLB,, | Performed by: SURGERY

## 2020-07-29 PROCEDURE — 99999 PR PBB SHADOW E&M-EST. PATIENT-LVL III: CPT | Mod: PBBFAC,,, | Performed by: SURGERY

## 2020-07-29 PROCEDURE — 3008F PR BODY MASS INDEX (BMI) DOCUMENTED: ICD-10-PCS | Mod: CPTII,S$GLB,, | Performed by: SURGERY

## 2020-08-03 NOTE — PROGRESS NOTES
History & Physical    SUBJECTIVE:     History of Present Illness:  Patient is a 40 y.o. female referred for right breast cyst. The patient denies any breast mass, nipple discharge, breast pain or any other changes. No family history of breast or ovarian cancer. Menarche age 10,  1st at 18, premenopausal.    Chief Complaint   Patient presents with    Post-op Evaluation       Review of patient's allergies indicates:  No Known Allergies    Current Outpatient Medications   Medication Sig Dispense Refill    diazePAM (VALIUM) 10 MG Tab TAKE 1 TABLET BY MOUTH NIGHTLY AS NEEDED FOR ANXIETY OR SLEEP.      escitalopram oxalate (LEXAPRO) 10 MG tablet TAKE 1 TABLET BY MOUTH EVERY DAY       No current facility-administered medications for this visit.        Past Medical History:   Diagnosis Date    Abnormal Pap smear     EKL, HPV, per pt, all normal since    Abnormal Pap smear of cervix     Muscle spasm      Past Surgical History:   Procedure Laterality Date     SECTION      x 1    TUBAL LIGATION       Family History   Problem Relation Age of Onset    Diabetes Maternal Grandmother     Diabetes Mother     Breast cancer Neg Hx     Colon cancer Neg Hx     Ovarian cancer Neg Hx     Thrombophilia Neg Hx      Social History     Tobacco Use    Smoking status: Never Smoker    Smokeless tobacco: Never Used   Substance Use Topics    Alcohol use: Yes     Comment: occasionally    Drug use: No        Review of Systems:  Review of Systems   Constitutional: Negative for activity change, appetite change, chills, diaphoresis, fatigue, fever and unexpected weight change.   HENT: Negative for congestion, dental problem, hearing loss, rhinorrhea, sore throat and trouble swallowing.    Eyes: Negative for discharge and visual disturbance.   Respiratory: Negative for cough, chest tightness, shortness of breath and wheezing.    Cardiovascular: Negative for chest pain, palpitations and leg swelling.   Gastrointestinal:  "Negative for abdominal distention, abdominal pain, blood in stool, constipation, diarrhea, nausea and vomiting.   Endocrine: Negative for cold intolerance, heat intolerance, polydipsia, polyphagia and polyuria.   Genitourinary: Negative for difficulty urinating, dysuria, frequency, hematuria, menstrual problem and urgency.   Musculoskeletal: Negative for arthralgias, gait problem, joint swelling, myalgias and neck pain.   Skin: Negative for color change, pallor, rash and wound.   Neurological: Negative for dizziness, syncope, weakness, light-headedness, numbness and headaches.   Hematological: Negative for adenopathy. Does not bruise/bleed easily.   Psychiatric/Behavioral: Negative for confusion, decreased concentration, dysphoric mood and sleep disturbance. The patient is not nervous/anxious.        OBJECTIVE:     Vital Signs (Most Recent)     5' 5" (1.651 m)  108 kg (238 lb 1.6 oz)     Physical Exam:  Physical Exam  Vitals signs reviewed.   Constitutional:       General: She is not in acute distress.     Appearance: She is well-developed. She is not diaphoretic.   HENT:      Head: Normocephalic and atraumatic.      Right Ear: External ear normal.      Left Ear: External ear normal.   Eyes:      General: No scleral icterus.     Conjunctiva/sclera: Conjunctivae normal.      Pupils: Pupils are equal, round, and reactive to light.   Neck:      Musculoskeletal: Normal range of motion and neck supple.      Thyroid: No thyromegaly.      Trachea: No tracheal deviation.   Cardiovascular:      Rate and Rhythm: Normal rate and regular rhythm.      Heart sounds: Normal heart sounds. No murmur. No friction rub. No gallop.    Pulmonary:      Effort: Pulmonary effort is normal. No respiratory distress.      Breath sounds: Normal breath sounds. No wheezing or rales.   Chest:      Chest wall: No tenderness.      Breasts:         Right: No inverted nipple, mass, nipple discharge, skin change or tenderness.         Left: No inverted " nipple, mass, nipple discharge, skin change or tenderness.   Abdominal:      General: Bowel sounds are normal. There is no distension.      Palpations: Abdomen is soft.      Tenderness: There is no abdominal tenderness.      Hernia: No hernia is present.   Musculoskeletal: Normal range of motion.         General: No tenderness or deformity.   Lymphadenopathy:      Cervical: No cervical adenopathy.   Skin:     General: Skin is warm and dry.      Coloration: Skin is not pale.      Findings: No erythema or rash.   Neurological:      Mental Status: She is alert and oriented to person, place, and time.   Psychiatric:         Behavior: Behavior normal.         Thought Content: Thought content normal.         Judgment: Judgment normal.             Diagnostic Results:  Result:   Mammo Digital Diagnostic Right w/ Jorge  US Breast Right Limited     History:  Patient is 40 y.o. and is seen for a diagnostic mammogram.     Films Compared:  Prior images (if available) were compared.     Findings:  This procedure was performed using tomosynthesis. Computer-aided detection was utilized in the interpretation of this examination.  Mammo Digital Diagnostic Right w/ Jorge  The right breast has scattered areas of fibroglandular density.     Asymmetry in outer quadrant on screening CC view  compressed out.     Finding in the UIQ persists on additional imaging.        There is no additional evidence of suspicious masses, calcifications, or other abnormal findings in the right breast.     US Breast Right Limited     Targeted imaging in the UIQ demonstrated a simple cyst at 2 o'clock measuring 0.9 x 0.2 x 0.5 cm.  This is 3 CMFN. This correlates well with the mammogram findings and is benign in appearance.     There is no evidence of suspicious masses or other abnormal findings in the right breast.     Impression:     9 mm simple cyst at 2 o'clock is benign.     There is no mammographic or sonographic evidence of malignancy in the right  breast.     BI-RADS Category:   Overall: 2 - Benign     Recommendation:  Routine screening mammogram in 1 year is recommended.     Your estimated lifetime risk of breast cancer (to age 85) based on Tyrer-Cuzick risk assessment model is Tyrer-Cuzick: 9.85 %. According to the American Cancer Society, patients with a lifetime breast cancer risk of 20% or higher might benefit from supplemental screening tests.       ASSESSMENT/PLAN:     41 y/o female with birads 2 mammogram, normal breast exam    PLAN:Plan     Reassurance/observation benign findings - Small nonpalpable cyst on mammogram birads 2 and normal breast exam  Annual mammogram/breast exam with OBGYN as cyst appears benign and not palpable  F/u with me prn

## 2023-04-12 PROBLEM — E78.49 OTHER HYPERLIPIDEMIA: Status: ACTIVE | Noted: 2023-04-12

## 2023-04-12 PROBLEM — E88.810 DYSMETABOLIC SYNDROME X: Status: ACTIVE | Noted: 2023-04-12

## 2023-06-13 ENCOUNTER — CLINICAL SUPPORT (OUTPATIENT)
Dept: INTERNAL MEDICINE | Facility: CLINIC | Age: 43
End: 2023-06-13
Payer: COMMERCIAL

## 2023-06-13 DIAGNOSIS — E78.49 OTHER HYPERLIPIDEMIA: ICD-10-CM

## 2023-06-13 DIAGNOSIS — E88.810 DYSMETABOLIC SYNDROME X: ICD-10-CM

## 2023-06-13 DIAGNOSIS — Z71.3 DIETARY COUNSELING: Primary | ICD-10-CM

## 2023-06-13 PROCEDURE — 97802 PR MED NUTR THER, 1ST, INDIV, EA 15 MIN: ICD-10-PCS | Mod: 95,,, | Performed by: DIETITIAN, REGISTERED

## 2023-06-13 PROCEDURE — 97802 MEDICAL NUTRITION INDIV IN: CPT | Mod: 95,,, | Performed by: DIETITIAN, REGISTERED

## 2023-06-13 NOTE — PROGRESS NOTES
The patient location is: [place]  The chief complaint leading to consultation is: nutrition referral    Visit type: audiovisual    Face to Face time with patient: 59 minutes  64 minutes of total time spent on the encounter, which includes face to face time and non-face to face time preparing to see the patient (eg, review of tests), Obtaining and/or reviewing separately obtained history, Documenting clinical information in the electronic or other health record, Independently interpreting results (not separately reported) and communicating results to the patient/family/caregiver, or Care coordination (not separately reported).         Each patient to whom he or she provides medical services by telemedicine is:  (1) informed of the relationship between the physician and patient and the respective role of any other health care provider with respect to management of the patient; and (2) notified that he or she may decline to receive medical services by telemedicine and may withdraw from such care at any time.    Notes:   Nutrition Assessment  Session Time:        Client name:  Camille Yuen  :  1980  Age:  43 y.o.  Gender:  female    Client states:  referred by Maricruz Caballero MD with a diagnosis of:  -Other hyperlipidemia  -Dysmetabolic syndrome X    Reports being told she is insulin resistant but is not in need of medication just yet.   Attempting diet/lifestyle changes first.  Will have lab work completed in August to see if there has been any improvement.   Would also like to work towards goal of losing 30 lbs.     Reports high weight of 270 lbs in the past.   3 months ago had tummy tuck procedure and has been trying to follow a high protein diet since then.  Reports needs a better understanding of carbohydrate intake in her diet.       Sample intake:  Breakfast: sausage, egg, cheese, biscuit// cheese grits  Lunch: [place] (baked fish)// [employer] (grilled chicken salad with honey mustard  "dressing)// [employer]  Dinner: cereal// frequent dine out// [employer] (shrimp tacos)// [employer]// [employer]   Drinks: water, coke zero (if having a craving for carbonated beverage)  Alcohol: none       Life: . Lives with: 16 year old twin sons + 24 year old daughter      Exercise:  received clearance for exercise// plans to start soon with walking 3x/week  Participated in [employer] classes previously (lost from 249 lbs to 222 lbs in 2 months) + meal prep.            Anthropometrics  Height:  65"     Weight:  213 lbs (self reported from an physician office visit on 2023)    2023: 217 lbs  BMI:  35.5  % Body Fat:  n/a    Clinical Signs/Symptoms  N/V/D:  none noted  Appetite:  good       Past Medical History:   Diagnosis Date    Abnormal Pap smear     EKL, HPV, per pt, all normal since    Abnormal Pap smear of cervix     Muscle spasm        Past Surgical History:   Procedure Laterality Date     SECTION      x 1    TUBAL LIGATION         Medications    has a current medication list which includes the following prescription(s): escitalopram oxalate.    Vitamins, Minerals, and/or Supplements:  none currently  When taking boot camp classes: Body toner, Vitamin D, multivitamin, fat burner     Food/Medication Interactions:  Reviewed     Food Allergies or Intolerances:  NKFA      Social History    Marital status:    Employment:  yes    Social History     Tobacco Use    Smoking status: Never    Smokeless tobacco: Never   Substance Use Topics    Alcohol use: Yes     Comment: occasionally        Lab Reports   Sodium   Date Value Ref Range Status   2023 142 134 - 144 mmol/L Final     Potassium   Date Value Ref Range Status   2023 4.7 3.5 - 5.2 mmol/L Final     Chloride   Date Value Ref Range Status   2023 105 96 - 106 mmol/L Final     CO2   Date Value Ref Range Status   2023 19 (L) 20 - 29 mmol/L Final     Glucose   Date Value Ref Range Status   2023 " 92 70 - 99 mg/dL Final     BUN   Date Value Ref Range Status   04/11/2023 11 6 - 24 mg/dL Final     Creatinine   Date Value Ref Range Status   04/11/2023 0.70 0.57 - 1.00 mg/dL Final     Calcium   Date Value Ref Range Status   04/11/2023 10.1 8.7 - 10.2 mg/dL Final     Albumin   Date Value Ref Range Status   04/11/2023 5.0 (H) 3.8 - 4.8 g/dL Final     Total Bilirubin   Date Value Ref Range Status   04/11/2023 <0.2 0.0 - 1.2 mg/dL Final     AST   Date Value Ref Range Status   04/11/2023 15 0 - 40 IU/L Final     ALT   Date Value Ref Range Status   04/11/2023 14 0 - 32 IU/L Final      Lab Results   Component Value Date    WBC 5.9 04/11/2023    HGB 11.7 04/11/2023    HCT 37.0 04/11/2023    MCV 88 04/11/2023     (H) 04/11/2023        Lab Results   Component Value Date    CHOL 211 (H) 04/11/2023     Lab Results   Component Value Date    HDL 63 04/11/2023     Lab Results   Component Value Date    LDLCALC 134 (H) 04/11/2023     Lab Results   Component Value Date    TRIG 76 04/11/2023     Lab Results   Component Value Date    CHOLHDL 23.8 11/17/2016     Lab Results   Component Value Date    HGBA1C 5.2 04/11/2023     BP Readings from Last 1 Encounters:   05/01/23 130/88       Food History  Provided above    Exercise History:  provided above    Cultural/Spiritual/Personal Preferences:  None identified    Support System:  family/friends    State of Change:  Contemplation    Barriers to Change:  none    Diagnosis    obesity related to excess energy intake as evidenced by BMI 35.      Intervention    RMR (Method:  [place] Jeor):  1622 kcal  Activity Factor:  1.3    VALDO:  2108 - 500 = 1608  kcal    Goals:  1.  160 grams of carbohydrates daily, evenly distributed amongst 5 small meals  2.  When consuming carbohydrates, choose high fiber sources and always pair with a  source of protein  3.  Walk for exercise 3x/week. Gradually over time increase intensity/duration/frequency     Nutrition Education  The following  education was provided to the patient:  Discussed meal planning/[employer] My Plate design.  Discussed healthy snacking.   Discussed label reading.  Discussed weight management.  Suggested dietary modifications based on current dietary behaviors and individual food preferences.  Discussed insulin resistance nutrition therapy  Discussed heart healthy nutrition     Patient verbalized understanding of nutrition education and recommendations received.    Handouts Provided, emailed  Eat Fit Shopping List  Fueling Well On-The-Go  Balanced Diabetes Plate  Snack List    Monitoring/Evaluation    Monitor the following:  Weight  BMI  Caloric intake  Labs:      Follow Up Plan:  7-7-2023

## 2023-07-07 ENCOUNTER — CLINICAL SUPPORT (OUTPATIENT)
Dept: INTERNAL MEDICINE | Facility: CLINIC | Age: 43
End: 2023-07-07
Payer: COMMERCIAL

## 2023-07-07 DIAGNOSIS — E88.810 DYSMETABOLIC SYNDROME X: ICD-10-CM

## 2023-07-07 DIAGNOSIS — E78.49 OTHER HYPERLIPIDEMIA: ICD-10-CM

## 2023-07-07 DIAGNOSIS — Z71.3 DIETARY COUNSELING: Primary | ICD-10-CM

## 2023-07-07 PROCEDURE — 97803 PR MED NUTR THER, SUBSQ, INDIV, EA 15 MIN: ICD-10-PCS | Mod: 95,,, | Performed by: DIETITIAN, REGISTERED

## 2023-07-07 PROCEDURE — 97803 MED NUTRITION INDIV SUBSEQ: CPT | Mod: 95,,, | Performed by: DIETITIAN, REGISTERED

## 2023-07-07 NOTE — PROGRESS NOTES
"The patient location is: [place]  The chief complaint leading to consultation is: nutrition referral follow-up    Visit type: audiovisual followed by audio only due to connection issue    Face to Face time with patient: 20 minutes  25 minutes of total time spent on the encounter, which includes face to face time and non-face to face time preparing to see the patient (eg, review of tests), Obtaining and/or reviewing separately obtained history, Documenting clinical information in the electronic or other health record, Independently interpreting results (not separately reported) and communicating results to the patient/family/caregiver, or Care coordination (not separately reported).         Each patient to whom he or she provides medical services by telemedicine is:  (1) informed of the relationship between the physician and patient and the respective role of any other health care provider with respect to management of the patient; and (2) notified that he or she may decline to receive medical services by telemedicine and may withdraw from such care at any time.    Notes:   Nutrition Assessment      Client name:  Camille Yuen  :  1980  Age:  43 y.o.  Gender:  female    Client states:  present for nutrition referral follow-up.  Last seen on 2023.    Reports losing 3 lbs since last appointment and has improved water intake.    Reports weekdays meals are good, but weekend intake could be improved mostly at breakfast.  Typically cooks large breakfasts for kids on  and Sundays and is unable to stop self from consuming the items as well.     Weekdays: [place] often. Due to being close to work. Baked fish + cabbage.  Weekend Lunch: grilled chicken salad, with only 1-2 croutons// baked fish with cabbage or green beans    Exercise: 30 minutes, 2-3x/week    Anthropometrics  Height:  65"     Weight:  210 lbs (self reported loss of 3 lbs)   213 lbs (self reported from an physician office visit on " 2023)               2023: 217 lbs  BMI:  35  % Body Fat:  n/a    Clinical Signs/Symptoms  N/V/D:  none noted  Appetite:  good       Past Medical History:   Diagnosis Date    Abnormal Pap smear     EKL, HPV, per pt, all normal since    Abnormal Pap smear of cervix     Muscle spasm        Past Surgical History:   Procedure Laterality Date     SECTION      x 1    TUBAL LIGATION         Medications    has a current medication list which includes the following prescription(s): escitalopram oxalate.    Vitamins, Minerals, and/or Supplements:  not discussed     Food/Medication Interactions:  Reviewed     Food Allergies or Intolerances:  NKFA      Social History    Marital status:    Employment:  yes    Social History     Tobacco Use    Smoking status: Never    Smokeless tobacco: Never   Substance Use Topics    Alcohol use: Yes     Comment: occasionally        Lab Reports   Sodium   Date Value Ref Range Status   2023 142 134 - 144 mmol/L Final     Potassium   Date Value Ref Range Status   2023 4.7 3.5 - 5.2 mmol/L Final     Chloride   Date Value Ref Range Status   2023 105 96 - 106 mmol/L Final     CO2   Date Value Ref Range Status   2023 19 (L) 20 - 29 mmol/L Final     Glucose   Date Value Ref Range Status   2023 92 70 - 99 mg/dL Final     BUN   Date Value Ref Range Status   2023 11 6 - 24 mg/dL Final     Creatinine   Date Value Ref Range Status   2023 0.70 0.57 - 1.00 mg/dL Final     Calcium   Date Value Ref Range Status   2023 10.1 8.7 - 10.2 mg/dL Final     Albumin   Date Value Ref Range Status   2023 5.0 (H) 3.8 - 4.8 g/dL Final     Total Bilirubin   Date Value Ref Range Status   2023 <0.2 0.0 - 1.2 mg/dL Final     AST   Date Value Ref Range Status   2023 15 0 - 40 IU/L Final     ALT   Date Value Ref Range Status   2023 14 0 - 32 IU/L Final      Lab Results   Component Value Date    WBC 5.9 2023    HGB 11.7  04/11/2023    HCT 37.0 04/11/2023    MCV 88 04/11/2023     (H) 04/11/2023        Lab Results   Component Value Date    CHOL 211 (H) 04/11/2023     Lab Results   Component Value Date    HDL 63 04/11/2023     Lab Results   Component Value Date    LDLCALC 134 (H) 04/11/2023     Lab Results   Component Value Date    TRIG 76 04/11/2023     Lab Results   Component Value Date    CHOLHDL 23.8 11/17/2016     Lab Results   Component Value Date    HGBA1C 5.2 04/11/2023     BP Readings from Last 1 Encounters:   05/01/23 130/88       Food History  Provided above    Exercise History:  provided above    Cultural/Spiritual/Personal Preferences:  None identified    Support System:  family/friends    State of Change:  Preparation    Barriers to Change:  none    Diagnosis    obesity related to previous excess energy intake as evidenced by BMI 35.    Intervention    RMR (Method:  [place] Jeor):  1608 kcal  Activity Factor:  1.3    VALDO:  2090 - 500 = 1590 kcal    Goals:  1.  Improve breakfast food choices using suggestions provided  2.  Increase exercise intensity/duration/frequency as able  3. Consistency with previous changes    Nutrition Education  The following education was provided to the patient:  Complimented patient on dietary compliance/modifications and resulting health improvements.  Complimented patient on physical activity efforts.  Discussed weight management.  Provided ongoing support, encouragement, and guidance toward improved health efforts.    Patient verbalized understanding of nutrition education and recommendations received.    Handouts Provided  none    Monitoring/Evaluation    Monitor the following:  Weight  BMI  Caloric intake  Labs:      Follow Up Plan:  8-

## 2023-08-24 NOTE — TELEPHONE ENCOUNTER
----- Message from Jeannine Kirk sent at 8/24/2017  1:05 PM CDT -----  Contact: pt   Pt returning nurses call,, please call pt back at [phone],,, DO NOT CALL CELL PHONE   8/24/2023 4:37 PM  Iain Douglas  [id number]    Subjective:    Remains in the ICU  She is awake with family in the room  Remains on ventilator     Review of Systems  Unable to obtain due to intubation       Scheduled Meds:   cefepime  2,000 mg IntraVENous Q12H    vancomycin  750 mg IntraVENous Q24H    pantoprazole (PROTONIX) 40 mg injection  40 mg IntraVENous Q12H    adenosine  6 mg IntraVENous Once    levETIRAcetam  500 mg Oral BID    senna  2 tablet Oral BID    ferrous sulfate  325 mg Oral Once per day on Mon Wed Fri    docusate sodium  100 mg Oral Daily    lidocaine  1 patch TransDERmal Daily    polyvinyl alcohol  1 drop Both Eyes Q4H    Or    artificial tears   Both Eyes Q4H    chlorhexidine  15 mL Mouth/Throat BID    sodium chloride flush  5-40 mL IntraVENous 2 times per day    polyethylene glycol  17 g Oral Daily    white petrolatum   Topical BID    [Held by provider] aspirin  81 mg Oral Daily    atorvastatin  10 mg Oral Nightly    baclofen  40 mg Oral BID    [Held by provider] bumetanide  1 mg Oral BID    oy[place] shell calcium w/D  1 tablet Oral Nightly    [Held by provider] clopidogrel  75 mg Oral Daily    vitamin B-12  500 mcg Oral Daily    ipratropium 0.5 mg-albuterol 2.5 mg  1 Dose Nebulization Q4H WA RT    levothyroxine  88 mcg Oral Daily    magnesium oxide  400 mg Oral QAM    ranolazine  1,000 mg Oral BID    sodium chloride  1 g Oral Daily    Vitamin D  1 tablet Oral QAM    dantrolene  100 mg Oral BID    sodium chloride flush  5-40 mL IntraVENous 2 times per day    enoxaparin  30 mg SubCUTAneous Daily    insulin lispro  0-4 Units SubCUTAneous TID     insulin lispro  0-4 Units SubCUTAneous Nightly       Objective:  Vitals:    08/24/23 1557   BP:    Pulse: 83   Resp: 23   Temp:    SpO2: 100%         Allergies: Augmentin [amoxicillin-pot clavulanate], Bactrim [sulfamethoxazole-trimethoprim], and Macrobid [nitrofurantoin]    General Appearance: eyes opening spontaneously, intubated   Skin: no rash or

## 2024-09-24 PROBLEM — R63.5 ABNORMAL WEIGHT GAIN: Status: ACTIVE | Noted: 2024-09-24

## 2024-09-24 PROBLEM — Z12.11 SCREENING FOR COLON CANCER: Status: ACTIVE | Noted: 2024-09-24

## 2025-04-07 ENCOUNTER — TELEPHONE (OUTPATIENT)
Dept: PHARMACY | Facility: CLINIC | Age: 45
End: 2025-04-07
Payer: COMMERCIAL

## 2025-04-07 NOTE — TELEPHONE ENCOUNTER
[facility]/Population Health Chart Review & Patient Outreach Details For Medication Adherence Project    Reason for Outreach Encounter: 3rd Party payor non-compliance report ([employer], [employer], C, etc)  2.  Patient Outreach Method: Reviewed patient chart  and [employer] message  3.   Medication in question:    Diabetes Medications              metFORMIN (GLUCOPHAGE-XR) 500 MG ER 24hr tablet One tab poqd x 1 week then increase to two tabs po qd                 LF 33 ds 12/12/24    4.  Reviewed and or Updates Made To: Patient Chart  5. Outreach Outcomes and/or actions taken: Sent inquiry to patient: Waiting for response  Additional Notes:

## 2025-04-17 PROBLEM — R74.8 INCREASED LIVER ENZYMES: Status: ACTIVE | Noted: 2025-04-17

## 2025-04-17 PROBLEM — E88.810 INSULIN RESISTANCE SYNDROME: Status: ACTIVE | Noted: 2025-04-17

## 2025-04-17 PROBLEM — R74.8 ALKALINE PHOSPHATASE ELEVATION: Status: ACTIVE | Noted: 2025-04-17

## 2025-07-04 ENCOUNTER — TELEPHONE (OUTPATIENT)
Dept: PHARMACY | Facility: CLINIC | Age: 45
End: 2025-07-04
Payer: COMMERCIAL

## 2025-07-04 NOTE — TELEPHONE ENCOUNTER
[facility]/Population Health Chart Review & Patient Outreach Details For Medication Adherence Project    Reason for Outreach Encounter: 3rd Party payor non-compliance report ([employer], [employer], C, etc)  2.  Patient Outreach Method: Reviewed Patient Chart  3.   Medication in question: Mounjaro 2.5mg   LAST FILLED: 6/29/25 for 28 day supply  Diabetes Medications              metFORMIN (GLUCOPHAGE-XR) 500 MG ER 24hr tablet Two tabsp oqd    tirzepatide (MOUNJARO) 2.5 mg/0.5 mL PnIj Inject 2.5 mg into the skin every 7 days.              4.  Reviewed and or Updates Made To: Patient Chart  5. Outreach Outcomes and/or actions taken: Patient filled medication and is on track to be adherent

## 2025-08-18 ENCOUNTER — TELEPHONE (OUTPATIENT)
Dept: PHARMACY | Facility: CLINIC | Age: 45
End: 2025-08-18
Payer: COMMERCIAL